# Patient Record
Sex: MALE | Race: WHITE | URBAN - METROPOLITAN AREA
[De-identification: names, ages, dates, MRNs, and addresses within clinical notes are randomized per-mention and may not be internally consistent; named-entity substitution may affect disease eponyms.]

---

## 2018-07-27 ENCOUNTER — HOSPITAL ENCOUNTER (OUTPATIENT)
Dept: OTHER | Age: 48
Discharge: HOME | End: 2018-07-27

## 2018-07-27 DIAGNOSIS — M54.6 PAIN IN THORACIC SPINE: ICD-10-CM

## 2024-10-30 ENCOUNTER — HOSPITAL ENCOUNTER (EMERGENCY)
Age: 54
Discharge: HOME | End: 2024-10-31
Attending: EMERGENCY MEDICINE
Payer: OTHER GOVERNMENT

## 2024-10-30 VITALS
RESPIRATION RATE: 16 BRPM | WEIGHT: 195.9 LBS | DIASTOLIC BLOOD PRESSURE: 95 MMHG | OXYGEN SATURATION: 96 % | HEART RATE: 66 BPM | SYSTOLIC BLOOD PRESSURE: 136 MMHG | TEMPERATURE: 98 F

## 2024-10-30 DIAGNOSIS — R30.0 DYSURIA: Primary | ICD-10-CM

## 2024-10-30 LAB
BACTERIA URNS QL MICRO: ABNORMAL
BILIRUB UR QL STRIP: NEGATIVE
CLARITY UR: CLEAR
COLOR UR: YELLOW
COMMENT: NORMAL
GLUCOSE UR STRIP-MCNC: NEGATIVE MG/DL
HGB UR QL STRIP.AUTO: NEGATIVE
KETONES UR STRIP-MCNC: NEGATIVE MG/DL
LEUKOCYTE ESTERASE UR QL STRIP: NEGATIVE
NITRITE UR QL STRIP: NEGATIVE
PH UR STRIP: 7.5 [PH] (ref 5–8)
PROT UR STRIP-MCNC: NEGATIVE MG/DL
RBC #/AREA URNS HPF: ABNORMAL /HPF
SP GR UR STRIP: 1.02 (ref 1–1.03)
UROBILINOGEN UR STRIP-ACNC: 1 EU/DL (ref 0–1)
WBC #/AREA URNS HPF: ABNORMAL /HPF

## 2024-10-30 PROCEDURE — 99283 EMERGENCY DEPT VISIT LOW MDM: CPT

## 2024-10-30 PROCEDURE — 81003 URINALYSIS AUTO W/O SCOPE: CPT

## 2024-10-30 PROCEDURE — 81001 URINALYSIS AUTO W/SCOPE: CPT

## 2024-10-30 RX ORDER — KETOROLAC TROMETHAMINE 30 MG/ML
30 INJECTION, SOLUTION INTRAMUSCULAR; INTRAVENOUS ONCE
Status: DISCONTINUED | OUTPATIENT
Start: 2024-10-30 | End: 2024-10-30

## 2024-10-30 RX ORDER — HYDROCODONE BITARTRATE AND ACETAMINOPHEN 5; 325 MG/1; MG/1
1 TABLET ORAL EVERY 6 HOURS PRN
COMMUNITY

## 2024-10-30 RX ORDER — METFORMIN HYDROCHLORIDE 500 MG/1
500 TABLET, EXTENDED RELEASE ORAL
COMMUNITY

## 2024-10-30 RX ORDER — GEMFIBROZIL 600 MG/1
600 TABLET, FILM COATED ORAL DAILY
COMMUNITY

## 2024-10-30 RX ORDER — ATENOLOL 25 MG/1
25 TABLET ORAL DAILY
COMMUNITY

## 2024-10-30 ASSESSMENT — PAIN SCALES - GENERAL: PAINLEVEL_OUTOF10: 3

## 2024-10-31 PROCEDURE — 6370000000 HC RX 637 (ALT 250 FOR IP): Performed by: EMERGENCY MEDICINE

## 2024-10-31 PROCEDURE — 87086 URINE CULTURE/COLONY COUNT: CPT

## 2024-10-31 RX ORDER — CIPROFLOXACIN 500 MG/1
500 TABLET, FILM COATED ORAL 2 TIMES DAILY
Qty: 10 TABLET | Refills: 0 | Status: SHIPPED | OUTPATIENT
Start: 2024-10-30 | End: 2024-11-04

## 2024-10-31 RX ORDER — PHENAZOPYRIDINE HYDROCHLORIDE 100 MG/1
200 TABLET, FILM COATED ORAL ONCE
Status: COMPLETED | OUTPATIENT
Start: 2024-10-31 | End: 2024-10-31

## 2024-10-31 RX ORDER — TAMSULOSIN HYDROCHLORIDE 0.4 MG/1
0.4 CAPSULE ORAL DAILY
Qty: 7 CAPSULE | Refills: 0 | Status: SHIPPED | OUTPATIENT
Start: 2024-10-31 | End: 2024-11-07

## 2024-10-31 RX ORDER — PHENAZOPYRIDINE HYDROCHLORIDE 200 MG/1
200 TABLET, FILM COATED ORAL 3 TIMES DAILY PRN
Qty: 6 TABLET | Refills: 0 | Status: SHIPPED | OUTPATIENT
Start: 2024-10-30 | End: 2024-11-05

## 2024-10-31 RX ORDER — CIPROFLOXACIN 500 MG/1
500 TABLET, FILM COATED ORAL ONCE
Status: COMPLETED | OUTPATIENT
Start: 2024-10-31 | End: 2024-10-31

## 2024-10-31 RX ADMIN — CIPROFLOXACIN 500 MG: 500 TABLET, FILM COATED ORAL at 00:08

## 2024-10-31 RX ADMIN — PHENAZOPYRIDINE HYDROCHLORIDE 200 MG: 100 TABLET ORAL at 00:08

## 2024-10-31 NOTE — DISCHARGE INSTR - COC
Continuity of Care Form    Patient Name: Nikita Olmos   :  1970  MRN:  [id number]    Admit date:  10/30/2024  Discharge date:  ***    Code Status Order: Prior   Advance Directives:   Advance Care Flowsheet Documentation             Admitting Physician:  No admitting provider for patient encounter.  PCP: Batool Barber MD    Discharging Nurse: ***  Discharging Hospital Unit/Room#:   Discharging Unit Phone Number: ***    Emergency Contact:   Extended Emergency Contact Information  Primary Emergency Contact: Denia Blum  Mobile Phone: [phone]  Relation: Parent   needed? No  Secondary Emergency Contact: Erlinda Madrid  Mobile Phone: [phone]  Relation: Friend   needed? No    Past Surgical History:  History reviewed. No pertinent surgical history.    Immunization History:     There is no immunization history on file for this patient.    Active Problems:  Patient Active Problem List   Diagnosis Code    Chest pain R07.9    Tobacco abuse Z72.0       Isolation/Infection:   Isolation            No Isolation          Patient Infection Status       None to display            Nurse Assessment:  Last Vital Signs: BP (!) 136/95   Pulse 66   Temp 98 °F (36.7 °C) (Oral)   Resp 16   Wt 88.9 kg (195 lb 14.4 oz)   SpO2 96%     Last documented pain score (0-10 scale): Pain Level: 3  Last Weight:   Wt Readings from Last 1 Encounters:   10/30/24 88.9 kg (195 lb 14.4 oz)     Mental Status:  {IP PT MENTAL STATUS:75050}    IV Access:  { VICKIE IV ACCESS:120805937}    Nursing Mobility/ADLs:  Walking   {CHP DME ADLs:892280577}  Transfer  {CHP DME ADLs:636252354}  Bathing  {CHP DME ADLs:885287388}  Dressing  {CHP DME ADLs:901356965}  Toileting  {CHP DME ADLs:794270336}  Feeding  {CHP DME ADLs:304892492}  Med Admin  {CHP DME ADLs:210737838}  Med Delivery   { VICKIE MED Delivery:981703930}    Wound Care Documentation and Therapy:        Elimination:  Continence:   Bowel: {YES /  NO:}  Bladder: {YES / NO:}  Urinary Catheter: {Urinary Catheter:492859984}   Colostomy/Ileostomy/Ileal Conduit: {YES / NO:}       Date of Last BM: ***  No intake or output data in the 24 hours ending 10/31/24 0010  No intake/output data recorded.    Safety Concerns:     { VICKIE Safety Concerns:917177623}    Impairments/Disabilities:      { VICKIE Impairments/Disabilities:882286559}    Nutrition Therapy:  Current Nutrition Therapy:   { VICKIE Diet List:121904146}    Routes of Feeding: {[facility] DME Other Feedings:243615958}  Liquids: {Slp liquid thickness:50532}  Daily Fluid Restriction: {[facility] DME Yes amt example:694635327}  Last Modified Barium Swallow with Video (Video Swallowing Test): {Done Not Done Date:664784257}    Treatments at the Time of Hospital Discharge:   Respiratory Treatments: ***  Oxygen Therapy:  {Therapy; copd oxygen:35837}  Ventilator:    {[facility] Vent List:765826757}    Rehab Therapies: {THERAPEUTIC INTERVENTION:1276955829}  Weight Bearing Status/Restrictions: {[facility] Weight Bearin}  Other Medical Equipment (for information only, NOT a DME order):  {EQUIPMENT:843600574}  Other Treatments: ***    Patient's personal belongings (please select all that are sent with patient):  {[facility] DME Belongings:286719259}    RN SIGNATURE:  {Esignature:258489654}    CASE MANAGEMENT/SOCIAL WORK SECTION    Inpatient Status Date: ***    Readmission Risk Assessment Score:  Readmission Risk              Risk of Unplanned Readmission:  0           Discharging to Facility/ Agency   Name:   Address:  Phone:  Fax:    Dialysis Facility (if applicable)   Name:  Address:  Dialysis Schedule:  Phone:  Fax:    / signature: {Esignature:896917418}    PHYSICIAN SECTION    Prognosis: {Prognosis:5762575305}    Condition at Discharge: { Patient Condition:350373484}    Rehab Potential (if transferring to Rehab): {Prognosis:1638300647}    Recommended Labs or Other Treatments After Discharge:

## 2024-11-01 LAB
MICROORGANISM SPEC CULT: NO GROWTH
SERVICE CMNT-IMP: NORMAL
SPECIMEN DESCRIPTION: NORMAL

## 2024-11-01 NOTE — ED PROVIDER NOTES
[facility] EMERGENCY DEPT     EMERGENCY DEPARTMENT ENCOUNTER            Pt Name: Nikita Olmos   MRN: [id number]   Birthdate 1970   Date of evaluation: 10/30/2024   Provider: Sobia Mir DO   PCP: Batool Barber MD   Note Started: 3:38 AM EDT 11/1/24          CHIEF COMPLAINT     Chief Complaint   Patient presents with    Urinary Frequency     Pt arrives with c/o urinary frequency, urgency, dysuria, and pain.              HISTORY OF PRESENT ILLNESS:   History from : Patient   Limitations to history : None     Nikita Olmos is a 54 y.o. male who presents to emergency department with complaints of dysuria and urinary hesitancy.  Patient states urinary hesitancy has been present intermittently for the last 3 to 4 months however he states prior to admission it was associated with severe dysuria and inability to eat.  He states prior to admission he was only able to void several cc of urine at which time he presented to the ED for evaluation.  Patient was in fact able to have a normal void on arrival.  He denies fever or vomiting.  Denies fever or abdominal pain.  He does report symptoms prior to admission were associated with mild left lumbosacral pain.  Patient denies penile discharge.  He denies any other associated symptoms.    Nursing Notes were all reviewed and agreed with, or any disagreements were addressed in the HPI.       REVIEW OF SYSTEMS :    Positives and Pertinent negatives as per HPI.      MEDICAL HISTORY   has a past medical history of Diabetes mellitus (HCC), Hyperlipidemia, and Hypertension.    History reviewed. No pertinent surgical history.   CURRENTMEDICATIONS       Discharge Medication List as of 10/31/2024 12:04 AM        CONTINUE these medications which have NOT CHANGED    Details   HYDROcodone-acetaminophen (NORCO) 5-325 MG per tablet Take 1 tablet by mouth every 6 hours as needed for Pain. Max Daily Amount: 4 tabletsHistorical Med      atenolol (TENORMIN) 25 MG tablet Take     All other components within normal limits   CULTURE, URINE   URINALYSIS WITH REFLEX TO CULTURE      When ordered only abnormal lab results are displayed. All other labs were within normal range or not returned as of this dictation.     RADIOLOGY:      Non-plain film images such as CT, Ultrasound and MRI are read by the radiologist. Plain radiographic images are visualized and preliminarily interpreted by the ED Provider with the below findings:   Interpretation per the Radiologist below, if available at the time of this note:  No orders to display            EKG: None    RHYTHM STRIP:    PROCEDURES   Unless otherwise noted below, none     CRITICAL CARE TIME   I personally saw the patient and independently provided 0 minutes of non-concurrent critical care out of the total shared critical care time excluding separately billable procedures.        Vitals:    Vitals:    10/30/24 2138 10/30/24 2202   BP: (!) 148/99 (!) 136/95   Pulse: 66    Resp: 16    Temp: 98 °F (36.7 °C)    TempSrc: Oral    SpO2: 96% 96%   Weight: 88.9 kg (195 lb 14.4 oz)                Exclusion criteria - the patient is NOT to be included for SEP-1 Core Measure due to:  2+ SIRS criteria are not met        CC/HPI Summary, DDx, ED Course, and Reassessment: Patient presents to emergency department with complaints of dysuria urinary hesitancy and some urinary frequency.  He reported inability to urinate several hours prior to coming to the emergency department but was unable to have a full urine stream on arrival.  Bladder scan prior to discharge was approximately 20 cc.  Patient's abdomen remains soft nontender nonsurgical in nature throughout his ED stay.  He remained afebrile nontoxic in appearance.  Urinalysis is unremarkable.  No evidence of infection or hematuria.  Given history and clinical presentation low-grade infection remains consideration.  Urine culture was obtained and patient received prescription for ciprofloxacin 500 mg twice daily

## 2025-02-05 ENCOUNTER — HOSPITAL ENCOUNTER (EMERGENCY)
Age: 55
Discharge: HOME | End: 2025-02-05
Attending: EMERGENCY MEDICINE
Payer: OTHER GOVERNMENT

## 2025-02-05 VITALS
DIASTOLIC BLOOD PRESSURE: 84 MMHG | HEART RATE: 66 BPM | WEIGHT: 195.6 LBS | OXYGEN SATURATION: 98 % | SYSTOLIC BLOOD PRESSURE: 151 MMHG | TEMPERATURE: 98.4 F | BODY MASS INDEX: 27.38 KG/M2 | RESPIRATION RATE: 18 BRPM | HEIGHT: 71 IN

## 2025-02-05 DIAGNOSIS — S05.02XA LEFT CORNEAL ABRASION, INITIAL ENCOUNTER: ICD-10-CM

## 2025-02-05 DIAGNOSIS — H04.202 WATERING OF LEFT EYE: Primary | ICD-10-CM

## 2025-02-05 PROCEDURE — 6370000000 HC RX 637 (ALT 250 FOR IP): Performed by: EMERGENCY MEDICINE

## 2025-02-05 PROCEDURE — 99283 EMERGENCY DEPT VISIT LOW MDM: CPT

## 2025-02-05 RX ORDER — TETRACAINE HYDROCHLORIDE 5 MG/ML
2 SOLUTION OPHTHALMIC ONCE
Status: COMPLETED | OUTPATIENT
Start: 2025-02-05 | End: 2025-02-05

## 2025-02-05 RX ORDER — ATENOLOL 25 MG/1
1 TABLET ORAL DAILY
COMMUNITY
Start: 2024-03-20 | End: 2025-03-22

## 2025-02-05 RX ORDER — ERYTHROMYCIN 5 MG/G
OINTMENT OPHTHALMIC ONCE
Status: COMPLETED | OUTPATIENT
Start: 2025-02-05 | End: 2025-02-05

## 2025-02-05 RX ADMIN — FLUORESCEIN SODIUM 1 MG: 1 STRIP OPHTHALMIC at 09:46

## 2025-02-05 RX ADMIN — ERYTHROMYCIN: 5 OINTMENT OPHTHALMIC at 10:05

## 2025-02-05 RX ADMIN — TETRACAINE HYDROCHLORIDE 2 DROP: 5 SOLUTION OPHTHALMIC at 09:46

## 2025-02-05 ASSESSMENT — PAIN DESCRIPTION - DESCRIPTORS: DESCRIPTORS: ACHING

## 2025-02-05 ASSESSMENT — PAIN DESCRIPTION - LOCATION: LOCATION: EYE

## 2025-02-05 ASSESSMENT — LIFESTYLE VARIABLES
HOW OFTEN DO YOU HAVE A DRINK CONTAINING ALCOHOL: NEVER
HOW MANY STANDARD DRINKS CONTAINING ALCOHOL DO YOU HAVE ON A TYPICAL DAY: PATIENT DOES NOT DRINK

## 2025-02-05 ASSESSMENT — PAIN - FUNCTIONAL ASSESSMENT: PAIN_FUNCTIONAL_ASSESSMENT: 0-10

## 2025-02-05 ASSESSMENT — PAIN DESCRIPTION - ORIENTATION: ORIENTATION: LEFT

## 2025-02-05 ASSESSMENT — VISUAL ACUITY: OU: 1

## 2025-02-05 ASSESSMENT — PAIN SCALES - GENERAL: PAINLEVEL_OUTOF10: 8

## 2025-02-05 NOTE — DISCHARGE INSTRUCTIONS
Use eye ointment 4X day while awake. You may use the pain drops I gave you 3-4X/day for the next 2 days then discard the remainder.

## 2025-02-05 NOTE — ED PROVIDER NOTES
[facility] EMERGENCY DEPARTMENT     EMERGENCY DEPARTMENT ENCOUNTER            Pt Name: Nikita Olmos   MRN: [id number]   Birthdate 1970   Date of evaluation: 2/5/2025   Provider: Killian Rodriguez MD   PCP: Batool Barber MD   Note Started: 10:00 AM EST 2/5/25          CHIEF COMPLAINT     Chief Complaint   Patient presents with    Eye Problem     Left eye red and clear drainage. Woke up this am like this. No no known injury.              HISTORY OF PRESENT ILLNESS:   History from : Patient   Limitations to history : None     Nikita Olmos is a 54 y.o. male who presents with watering left eye since 1 AM. State noted a red eye. No hx of FB. States feels like pins and needles. No visual changes.     Nursing Notes were all reviewed and agreed with, or any disagreements were addressed in the HPI.     REVIEW OF SYSTEMS :    Positives and Pertinent negatives as per HPI.      MEDICAL HISTORY   has a past medical history of Diabetes mellitus (HCC), Hyperlipidemia, and Hypertension.    History reviewed. No pertinent surgical history.   CURRENTMEDICATIONS       Previous Medications    ATENOLOL (TENORMIN) 25 MG TABLET    Take 1 tablet by mouth daily    ATENOLOL (TENORMIN) 25 MG TABLET    Take 1 tablet by mouth daily    GEMFIBROZIL (LOPID) 600 MG TABLET    Take 1 tablet by mouth daily    HYDROCODONE-ACETAMINOPHEN (NORCO) 5-325 MG PER TABLET    Take 1 tablet by mouth every 6 hours as needed for Pain.    LORATADINE (CLARITIN) 10 MG TABLET    Take 1 tablet by mouth daily    METFORMIN (GLUCOPHAGE-XR) 500 MG EXTENDED RELEASE TABLET    Take 1 tablet by mouth daily (with breakfast)    TAMSULOSIN (FLOMAX) 0.4 MG CAPSULE    Take 1 capsule by mouth daily for 7 doses Take in am with first meal    VITAMIN D (CHOLECALCIFEROL) 25 MCG (1000 UT) TABS TABLET    Take 1 tablet by mouth daily      SCREENINGS          [place] Coma Scale  Eye Opening: Spontaneous  Best Verbal Response: Oriented  Best Motor Response: Obeys